# Patient Record
Sex: MALE | URBAN - METROPOLITAN AREA
[De-identification: names, ages, dates, MRNs, and addresses within clinical notes are randomized per-mention and may not be internally consistent; named-entity substitution may affect disease eponyms.]

---
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
## 2023-01-04 ENCOUNTER — OFFICE VISIT (OUTPATIENT)
Dept: FAMILY MEDICINE | Facility: CLINIC | Age: 18
End: 2023-01-04
Payer: COMMERCIAL

## 2023-01-04 VITALS
SYSTOLIC BLOOD PRESSURE: 110 MMHG | OXYGEN SATURATION: 96 % | HEIGHT: 71 IN | HEART RATE: 87 BPM | RESPIRATION RATE: 18 BRPM | WEIGHT: 157.88 LBS | TEMPERATURE: 99 F | DIASTOLIC BLOOD PRESSURE: 64 MMHG | BODY MASS INDEX: 22.1 KG/M2

## 2023-01-04 DIAGNOSIS — H61.21 IMPACTED CERUMEN, RIGHT EAR: Primary | ICD-10-CM

## 2023-01-04 DIAGNOSIS — J32.9 RECURRENT SINUSITIS: ICD-10-CM

## 2023-01-04 DIAGNOSIS — R42 DIZZINESS AND GIDDINESS: ICD-10-CM

## 2023-01-04 PROCEDURE — 99204 PR OFFICE/OUTPT VISIT, NEW, LEVL IV, 45-59 MIN: ICD-10-PCS | Mod: S$GLB,,,

## 2023-01-04 PROCEDURE — 99999 PR PBB SHADOW E&M-EST. PATIENT-LVL V: CPT | Mod: PBBFAC,,,

## 2023-01-04 PROCEDURE — 99999 PR PBB SHADOW E&M-EST. PATIENT-LVL V: ICD-10-PCS | Mod: PBBFAC,,,

## 2023-01-04 PROCEDURE — 99204 OFFICE O/P NEW MOD 45 MIN: CPT | Mod: S$GLB,,,

## 2023-01-04 RX ORDER — MECLIZINE HCL 12.5 MG 12.5 MG/1
12.5 TABLET ORAL 3 TIMES DAILY PRN
Qty: 90 TABLET | Refills: 1 | Status: SHIPPED | OUTPATIENT
Start: 2023-01-04 | End: 2024-01-30

## 2023-01-04 NOTE — PROGRESS NOTES
Subjective:       Patient ID: Graeme Ramirez is a 17 y.o. male.    Chief Complaint: Ear Problem (Right), Loss of Consciousness, and Dizziness    Presents to the clinic w/ complaints of R ear congestion and loss of hearing. He has been experiencing episodes of dizziness and lost consciousness briefly during one event. Saw another provider recently for same. His parents are on the phone during the visit and they assist with HPI. They report that the pt has extensive ENT hx. States that he has had multiple tube procedures, adenoid removal, tonsillectomy. They report that he continues to have issues with sinus drainage. Ears get congested frequently. Lots of cerumen. He was given drops at last appt with have provided mild relief. His mother states that when his sinuses get blocked up he quickly develops an acute illness which eventually progresses to worsening of asthma, chest congestion and pneumonia. They explain that the dizziness is a recurrent issue for him. Relieved with meclizine before.         History reviewed. No pertinent past medical history.    Review of patient's allergies indicates:  No Known Allergies      Current Outpatient Medications:     albuterol (PROVENTIL/VENTOLIN HFA) 90 mcg/actuation inhaler, INHALE 2 PUFFS INTO THE LUNGS EVERY 4 (FOUR) HOURS AS NEEDED FOR WHEEZING, use 30 min prior to workouts, Disp: 18 g, Rfl: 6    azelastine (ASTELIN) 137 mcg (0.1 %) nasal spray, 1 spray (137 mcg total) by Nasal route 2 (two) times daily., Disp: 30 mL, Rfl: 1    ciprofloxacin-dexAMETHasone 0.3-0.1% (CIPRODEX) 0.3-0.1 % DrpS, Place 4 drops into both ears 2 (two) times daily., Disp: 7.5 mL, Rfl: 0    fluticasone propionate (FLONASE) 50 mcg/actuation nasal spray, SPRAY 2 SPRAYS INTO EACH NOSTRIL EVERY DAY, Disp: , Rfl:     loratadine (CLARITIN) 10 mg tablet, Take 1 tablet (10 mg total) by mouth once daily., Disp: 30 tablet, Rfl: 6    montelukast (SINGULAIR) 10 mg tablet, Take 1 tablet (10 mg total) by mouth  "every evening., Disp: 30 tablet, Rfl: 2    olopatadine (PATADAY) 0.2 % Drop, INSTILL 1 DROP INTO BOTH EYES EVERY DAY, Disp: , Rfl:     meclizine (ANTIVERT) 12.5 mg tablet, Take 1 tablet (12.5 mg total) by mouth 3 (three) times daily as needed for Dizziness., Disp: 90 tablet, Rfl: 1    Review of Systems   HENT:  Positive for congestion, ear discharge, hearing loss and sinus pressure. Negative for ear pain.    Neurological:  Positive for dizziness and syncope.     Objective:      /64 (BP Location: Right arm, Patient Position: Sitting, BP Method: Medium (Manual))   Pulse 87   Temp 98.5 °F (36.9 °C) (Oral)   Resp 18   Ht 5' 11" (1.803 m)   Wt 71.6 kg (157 lb 13.6 oz)   SpO2 96%   BMI 22.02 kg/m²   Physical Exam  Vitals reviewed.   Constitutional:       General: He is not in acute distress.     Appearance: Normal appearance. He is normal weight. He is not ill-appearing, toxic-appearing or diaphoretic.   HENT:      Head: Normocephalic.      Right Ear: External ear normal. There is impacted cerumen.      Left Ear: External ear normal. There is impacted cerumen.      Ears:      Comments: Clear after washout       Nose: Nose normal. No congestion or rhinorrhea.      Mouth/Throat:      Mouth: Mucous membranes are moist.      Pharynx: Oropharynx is clear.   Eyes:      General: No scleral icterus.        Right eye: No discharge.         Left eye: No discharge.      Extraocular Movements: Extraocular movements intact.      Conjunctiva/sclera: Conjunctivae normal.   Cardiovascular:      Rate and Rhythm: Normal rate and regular rhythm.      Pulses: Normal pulses.      Heart sounds: Normal heart sounds. No murmur heard.    No friction rub. No gallop.   Pulmonary:      Effort: Pulmonary effort is normal. No respiratory distress.      Breath sounds: Normal breath sounds. No wheezing, rhonchi or rales.   Chest:      Chest wall: No tenderness.   Musculoskeletal:         General: No swelling, tenderness or deformity. Normal " range of motion.      Cervical back: Normal range of motion.      Right lower leg: No edema.      Left lower leg: No edema.   Skin:     General: Skin is warm and dry.      Capillary Refill: Capillary refill takes less than 2 seconds.      Coloration: Skin is not jaundiced.      Findings: No bruising, erythema, lesion or rash.   Neurological:      Mental Status: He is alert and oriented to person, place, and time.      Gait: Gait normal.   Psychiatric:         Mood and Affect: Mood normal.         Behavior: Behavior normal.         Thought Content: Thought content normal.         Judgment: Judgment normal.       Assessment:       1. Impacted cerumen, right ear    2. Recurrent sinusitis    3. Dizziness and giddiness          Plan:       Impacted cerumen, right ear  -     Ambulatory referral/consult to ENT; Future; Expected date: 01/11/2023        -     Copious amounts of cerumen removed bilaterally with ear wash.  I examined the ear again after the wash and could visualize the TM bilaterally. Canals clear. Recommended he  debrox to wash out ears occasionally and keep them clear. Will have him establish with an ENT given his hx.     Recurrent sinusitis  -     Ambulatory referral/consult to ENT; Future; Expected date: 01/11/2023    Dizziness and giddiness  -     meclizine (ANTIVERT) 12.5 mg tablet; Take 1 tablet (12.5 mg total) by mouth 3 (three) times daily as needed for Dizziness.  Dispense: 90 tablet; Refill: 1                 Johan Mcwilliams PA-C  Family Medicine Physician Assistant       I spent a total of 20 minutes on the day of the visit.This includes face to face time and non-face to face time preparing to see the patient (eg, review of tests), obtaining and/or reviewing separately obtained history, documenting clinical information in the electronic or other health record, independently interpreting results and communicating results to the patient/family/caregiver, or care coordinator.      We have  addressed [4] Moderate: 1 or more chronic illnesses with exacerbation, progression, or side effects of treatment / 2 or more stable chronic illnesses / 1 undiagnosed new problem with uncertain prognosis / 1 acute illness with systemic symptoms / 1 acute complicated injury  The complexity of the data reviewed and analyzed for this visit was [2] Minimal or None  The risk of complications and/or morbidity or mortality are [4] Moderate risk (I.e. prescription drug management / decision regarding minor surgery with identified pt or procedure risk factors / decision regarding elective major surgery without identified pt or procedure risk factors / diagnosis or treatment significantly limited by social determinants of health)   The level of Medical Decision Making for this visit is [4] Moderate

## 2023-01-04 NOTE — PATIENT INSTRUCTIONS
Debrox ear cleaning kits at any pharmacy. Its a yellow and green box.       Matthew Bedolla W,     If you are due for any health screening(s) below please notify me so we can arrange them to be ordered and scheduled to maintain your health. Most healthy patients complete it. Don't lose out on improving your health.     All of your core healthy metrics are met.

## 2023-12-06 ENCOUNTER — OFFICE VISIT (OUTPATIENT)
Dept: URGENT CARE | Facility: CLINIC | Age: 18
End: 2023-12-06
Payer: COMMERCIAL

## 2023-12-06 VITALS
HEART RATE: 66 BPM | SYSTOLIC BLOOD PRESSURE: 115 MMHG | RESPIRATION RATE: 16 BRPM | BODY MASS INDEX: 23.07 KG/M2 | OXYGEN SATURATION: 100 % | TEMPERATURE: 98 F | WEIGHT: 161.19 LBS | HEIGHT: 70 IN | DIASTOLIC BLOOD PRESSURE: 78 MMHG

## 2023-12-06 DIAGNOSIS — J45.30 MILD PERSISTENT ASTHMA WITHOUT COMPLICATION: ICD-10-CM

## 2023-12-06 DIAGNOSIS — R09.89 SYMPTOMS OF UPPER RESPIRATORY INFECTION (URI): Primary | ICD-10-CM

## 2023-12-06 LAB
CTP QC/QA: YES
MOLECULAR STREP A: NEGATIVE
POC MOLECULAR INFLUENZA A AGN: NEGATIVE
POC MOLECULAR INFLUENZA B AGN: NEGATIVE
SARS-COV-2 RDRP RESP QL NAA+PROBE: NEGATIVE

## 2023-12-06 PROCEDURE — 99214 OFFICE O/P EST MOD 30 MIN: CPT | Mod: S$PBB,,,

## 2023-12-06 PROCEDURE — 99214 OFFICE O/P EST MOD 30 MIN: CPT | Mod: PBBFAC

## 2023-12-06 PROCEDURE — 87635 SARS-COV-2 COVID-19 AMP PRB: CPT | Mod: PBBFAC

## 2023-12-06 PROCEDURE — 99214 PR OFFICE/OUTPT VISIT, EST, LEVL IV, 30-39 MIN: ICD-10-PCS | Mod: S$PBB,,,

## 2023-12-06 PROCEDURE — 87651 STREP A DNA AMP PROBE: CPT | Mod: PBBFAC

## 2023-12-06 PROCEDURE — 87502 INFLUENZA DNA AMP PROBE: CPT | Mod: PBBFAC

## 2023-12-06 RX ORDER — PROMETHAZINE HYDROCHLORIDE AND DEXTROMETHORPHAN HYDROBROMIDE 6.25; 15 MG/5ML; MG/5ML
5 SYRUP ORAL EVERY 6 HOURS PRN
Qty: 118 ML | Refills: 0 | Status: SHIPPED | OUTPATIENT
Start: 2023-12-06 | End: 2023-12-16

## 2023-12-06 RX ORDER — BENZONATATE 200 MG/1
200 CAPSULE ORAL 3 TIMES DAILY PRN
Qty: 30 CAPSULE | Refills: 0 | Status: SHIPPED | OUTPATIENT
Start: 2023-12-06 | End: 2023-12-16

## 2023-12-06 RX ORDER — MONTELUKAST SODIUM 10 MG/1
10 TABLET ORAL NIGHTLY
Qty: 30 TABLET | Refills: 2 | Status: SHIPPED | OUTPATIENT
Start: 2023-12-06

## 2023-12-06 RX ORDER — ALBUTEROL SULFATE 90 UG/1
AEROSOL, METERED RESPIRATORY (INHALATION)
Qty: 18 G | Refills: 6 | Status: SHIPPED | OUTPATIENT
Start: 2023-12-06

## 2023-12-06 NOTE — LETTER
December 6, 2023      [facility]  [address] [address]  Phone: [phone]       Patient: Graeme Ramirez   YOB: 2005  Date of Visit: 12/06/2023    To Whom It May Concern:    Cresencio Ramirez  was at [facility] on 12/06/2023. The patient may return to work/school on 12/8/2023 with no restrictions. If you have any questions or concerns, or if I can be of further assistance, please do not hesitate to contact me.    Sincerely,    JONES Duffy

## 2023-12-06 NOTE — PROGRESS NOTES
"Subjective:      Patient ID: Graeme Ramirez is a 18 y.o. male.    Vitals:  height is 5' 10" (1.778 m) and weight is 73.1 kg (161 lb 3.2 oz). His temperature is 98.4 °F (36.9 °C). His blood pressure is 115/78 and his pulse is 66. His respiration is 16 and oxygen saturation is 100%.     Chief Complaint: URI (Cough, congestion, sore throat since yesterday.)    Pt states cough, congestion and sore throat since yesterday. Denies known sick contacts.     URI   Associated symptoms include congestion, coughing and a sore throat.       Constitution: Negative.   HENT:  Positive for congestion and sore throat.    Neck: neck negative.   Cardiovascular: Negative.    Eyes: Negative.    Respiratory:  Positive for cough.    Gastrointestinal: Negative.    Genitourinary: Negative.    Musculoskeletal: Negative.    Skin: Negative.    Neurological: Negative.       Objective:     Physical Exam   Constitutional: He is oriented to person, place, and time. normal  HENT:   Head: Normocephalic.   Ears:   Right Ear: External ear normal. impacted cerumen  Left Ear: External ear normal. impacted cerumen  Nose: Congestion present.   Mouth/Throat: Uvula is midline, oropharynx is clear and moist and mucous membranes are normal. Mucous membranes are moist. Oropharynx is clear.   Eyes: Pupils are equal, round, and reactive to light.   Neck: Neck supple.   Cardiovascular: Normal rate, regular rhythm, normal heart sounds and normal pulses.   Pulmonary/Chest: Effort normal and breath sounds normal.   Abdominal: Normal appearance. Soft.   Musculoskeletal: Normal range of motion.         General: Normal range of motion.   Neurological: He is alert and oriented to person, place, and time.   Skin: Skin is warm and dry.   Vitals reviewed.    Results for orders placed or performed in visit on 12/06/23   POCT COVID-19 Rapid Screening   Result Value Ref Range    POC Rapid COVID Negative Negative     Acceptable Yes    POCT Influenza A/B " Molecular   Result Value Ref Range    POC Molecular Influenza A Ag Negative Negative, Not Reported    POC Molecular Influenza B Ag Negative Negative, Not Reported     Acceptable Yes    POCT Strep A, Molecular   Result Value Ref Range    Molecular Strep A, POC Negative Negative     Acceptable Yes         Assessment:     1. Symptoms of upper respiratory infection (URI)    2. Mild persistent asthma without complication        Plan:       Symptoms of upper respiratory infection (URI)  -     POCT COVID-19 Rapid Screening  -     POCT Influenza A/B Molecular  -     POCT Strep A, Molecular  -     benzonatate (TESSALON) 200 MG capsule; Take 1 capsule (200 mg total) by mouth 3 (three) times daily as needed for Cough.  Dispense: 30 capsule; Refill: 0  -     promethazine-dextromethorphan (PROMETHAZINE-DM) 6.25-15 mg/5 mL Syrp; Take 5 mLs by mouth every 6 (six) hours as needed (cough).  Dispense: 118 mL; Refill: 0    Mild persistent asthma without complication  -     albuterol (PROVENTIL/VENTOLIN HFA) 90 mcg/actuation inhaler; INHALE 2 PUFFS INTO THE LUNGS EVERY 4 (FOUR) HOURS AS NEEDED FOR WHEEZING, use 30 min prior to workouts  Dispense: 18 g; Refill: 6  -     montelukast (SINGULAIR) 10 mg tablet; Take 1 tablet (10 mg total) by mouth every evening.  Dispense: 30 tablet; Refill: 2  -     carbamide peroxide (DEBROX) 6.5 % otic solution; Place 5 drops into both ears 2 (two) times daily.  Dispense: 15 mL; Refill: 0  -     Ambulatory referral/consult to Family Practice      Please drink plenty of fluids.  Please get plenty of rest.    Take over the counter Tylenol (Acetaminophen) and/or Motrin (Ibuprofen) as directed for control of pain and/or fever.  Please follow up with your primary care doctor.     ER precautions given, patient verbalized understanding.     Please see provided patient education for guidance.    Follow up with PCP or return to clinic if symptoms worsen or do not improve.

## 2023-12-12 ENCOUNTER — OFFICE VISIT (OUTPATIENT)
Dept: URGENT CARE | Facility: CLINIC | Age: 18
End: 2023-12-12
Payer: COMMERCIAL

## 2023-12-12 VITALS
RESPIRATION RATE: 18 BRPM | HEART RATE: 59 BPM | SYSTOLIC BLOOD PRESSURE: 115 MMHG | WEIGHT: 164.38 LBS | BODY MASS INDEX: 23.53 KG/M2 | DIASTOLIC BLOOD PRESSURE: 74 MMHG | HEIGHT: 70 IN | TEMPERATURE: 98 F | OXYGEN SATURATION: 99 %

## 2023-12-12 DIAGNOSIS — H61.20 IMPACTED CERUMEN, UNSPECIFIED LATERALITY: Primary | ICD-10-CM

## 2023-12-12 PROCEDURE — 99214 OFFICE O/P EST MOD 30 MIN: CPT | Mod: PBBFAC | Performed by: FAMILY MEDICINE

## 2023-12-12 PROCEDURE — 69209 REMOVE IMPACTED EAR WAX UNI: CPT | Mod: PBBFAC | Performed by: FAMILY MEDICINE

## 2023-12-12 PROCEDURE — 69209 EAR CERUMEN REMOVAL: ICD-10-PCS | Mod: S$PBB,50,, | Performed by: FAMILY MEDICINE

## 2023-12-12 NOTE — PROCEDURES
"Ear Cerumen Removal    Date/Time: 12/12/2023 4:15 PM    Performed by: Duncan Paniagua MD  Authorized by: Duncan Paniagua MD    Time out: Immediately prior to procedure a "time out" was called to verify the correct patient, procedure, equipment, support staff and site/side marked as required.    Consent Done?:  Yes (Verbal)    Local anesthetic:  None  Location details:  Both ears  Procedure type: irrigation    Cerumen  Removal Results:  Cerumen completely removed  Patient tolerance:  Patient tolerated the procedure well with no immediate complications    "

## 2023-12-12 NOTE — PROGRESS NOTES
"Subjective:       Patient ID: Graeme Ramirez is a 18 y.o. male.    Vitals:  height is 5' 10" (1.778 m) and weight is 74.6 kg (164 lb 6.4 oz). His oral temperature is 98.1 °F (36.7 °C). His blood pressure is 115/74 and his pulse is 59 (abnormal). His respiration is 18 and oxygen saturation is 99%.     Chief Complaint: Cerumen Impaction (Patient reports both ears clogged and sounds "muffled" )    Patient with a history of bilateral cerumen impaction.  States he gets his ears cleaned every 6 months or so.  Having a recurrence.  Decreased hearing, especially on the left.  Occasional drainage on the right.  No pain.  No sore throat.  No fever.  Chart reviewed        Objective:   Physical Exam   Constitutional: He appears well-developed.  Non-toxic appearance. He does not appear ill. No distress.   HENT:   Ears:   Right Ear: impacted cerumen  Left Ear: impacted cerumen  Mouth/Throat: No posterior oropharyngeal erythema.   Neck: No neck rigidity present.   Abdominal: Normal appearance.   Lymphadenopathy:     He has no cervical adenopathy.   Neurological: no focal deficit. He is alert. He displays no weakness.   Skin: Skin is not diaphoretic. Capillary refill takes less than 2 seconds.   Psychiatric: His behavior is normal. Mood normal.   Nursing note and vitals reviewed.        Assessment:     1. Impacted cerumen, unspecified laterality          Plan:   Bilateral ear flush with complete removal of cerumen.  Tolerated well.  He will get over-the-counter ear cleaning kit as we discussed.  Return to urgent care if need    Impacted cerumen, unspecified laterality        Please note: This chart was completed via voice to text dictation. It may contain typographical/word recognition errors. If there are any questions, please contact the provider for final clarification.      "

## 2024-01-30 ENCOUNTER — OFFICE VISIT (OUTPATIENT)
Dept: URGENT CARE | Facility: CLINIC | Age: 19
End: 2024-01-30
Payer: COMMERCIAL

## 2024-01-30 VITALS
BODY MASS INDEX: 22.54 KG/M2 | HEIGHT: 71 IN | SYSTOLIC BLOOD PRESSURE: 118 MMHG | HEART RATE: 101 BPM | OXYGEN SATURATION: 97 % | WEIGHT: 161 LBS | TEMPERATURE: 99 F | DIASTOLIC BLOOD PRESSURE: 83 MMHG | RESPIRATION RATE: 20 BRPM

## 2024-01-30 DIAGNOSIS — R10.9 ABDOMINAL PAIN, UNSPECIFIED ABDOMINAL LOCATION: ICD-10-CM

## 2024-01-30 DIAGNOSIS — J30.2 SEASONAL ALLERGIES: Primary | ICD-10-CM

## 2024-01-30 DIAGNOSIS — U07.1 COVID-19 VIRUS DETECTED: ICD-10-CM

## 2024-01-30 DIAGNOSIS — R51.9 NONINTRACTABLE HEADACHE, UNSPECIFIED CHRONICITY PATTERN, UNSPECIFIED HEADACHE TYPE: ICD-10-CM

## 2024-01-30 DIAGNOSIS — R42 DIZZINESS: ICD-10-CM

## 2024-01-30 DIAGNOSIS — R07.0 THROAT DISCOMFORT: ICD-10-CM

## 2024-01-30 LAB
CTP QC/QA: YES
CTP QC/QA: YES
FLUAV AG UPPER RESP QL IA.RAPID: NOT DETECTED
FLUBV AG UPPER RESP QL IA.RAPID: NOT DETECTED
MOLECULAR STREP A: NEGATIVE
POC MOLECULAR INFLUENZA A AGN: NEGATIVE
POC MOLECULAR INFLUENZA B AGN: NEGATIVE
RSV A 5' UTR RNA NPH QL NAA+PROBE: NOT DETECTED
SARS-COV-2 RNA RESP QL NAA+PROBE: DETECTED

## 2024-01-30 PROCEDURE — 99214 OFFICE O/P EST MOD 30 MIN: CPT | Mod: S$PBB,,,

## 2024-01-30 PROCEDURE — 87502 INFLUENZA DNA AMP PROBE: CPT | Mod: PBBFAC

## 2024-01-30 PROCEDURE — 99214 OFFICE O/P EST MOD 30 MIN: CPT | Mod: PBBFAC

## 2024-01-30 PROCEDURE — 0241U COVID/RSV/FLU A&B PCR: CPT

## 2024-01-30 PROCEDURE — 87651 STREP A DNA AMP PROBE: CPT | Mod: PBBFAC

## 2024-01-30 RX ORDER — MECLIZINE HYDROCHLORIDE 25 MG/1
25 TABLET ORAL 3 TIMES DAILY PRN
Qty: 21 TABLET | Refills: 0 | Status: SHIPPED | OUTPATIENT
Start: 2024-01-30

## 2024-01-30 RX ORDER — DICYCLOMINE HYDROCHLORIDE 20 MG/1
20 TABLET ORAL EVERY 6 HOURS
Qty: 20 TABLET | Refills: 0 | Status: SHIPPED | OUTPATIENT
Start: 2024-01-30 | End: 2024-02-04

## 2024-01-30 NOTE — LETTER
January 30, 2024      [facility]  [address] [facility] [address]  Phone: [phone]       Patient: Graeme Ramirez   YOB: 2005  Date of Visit: 01/30/2024    To Whom It May Concern:    Cresencio Ramirez  was at [facility] on 01/30/2024. The patient may return to work/school on 02/03/2024 with no restrictions. If you have any questions or concerns, or if I can be of further assistance, please do not hesitate to contact me.    Sincerely,    JONES Hawk

## 2024-01-30 NOTE — PROGRESS NOTES
"Subjective:       Patient ID: Graeme Ramirez is a 18 y.o. male.    Vitals:  height is 5' 11" (1.803 m) and weight is 73 kg (161 lb). His oral temperature is 98.8 °F (37.1 °C). His blood pressure is 118/83 and his pulse is 101. His respiration is 20 and oxygen saturation is 97%.     Chief Complaint: URI (Nasal congestion, headache, dizziness, nausea, abdominal pain and itchy throat.)    18-year-old  male reports symptoms for 2 days, states he has been taking DayQuil with some improvement.  States dizziness worsens upon awakening which last approximately 30 minutes.  Denies any known ill contacts.     URI   Associated symptoms include abdominal pain, headaches, nausea, rhinorrhea and a sore throat. Pertinent negatives include no coughing, diarrhea, ear pain or vomiting.       Constitution: Positive for chills and fatigue. Negative for fever.   HENT:  Positive for sore throat. Negative for ear pain.    Respiratory:  Negative for cough and shortness of breath.    Gastrointestinal:  Positive for abdominal pain and nausea. Negative for vomiting and diarrhea.   Musculoskeletal:  Negative for muscle ache.   Allergic/Immunologic: Positive for seasonal allergies.   Neurological:  Positive for dizziness and headaches.       Objective:      Physical Exam   Constitutional: He is oriented to person, place, and time. He is cooperative. He is easily aroused. He does not appear ill. No distress. awake  HENT:   Head: Normocephalic and atraumatic.   Ears:   Right Ear: Tympanic membrane normal.   Left Ear: Tympanic membrane normal.   Nose: Nose normal. Right sinus exhibits no maxillary sinus tenderness and no frontal sinus tenderness. Left sinus exhibits no maxillary sinus tenderness and no frontal sinus tenderness.   Mouth/Throat: Uvula is midline and mucous membranes are normal. Posterior oropharyngeal erythema present. Tonsils are 1+ on the right. Tonsils are 1+ on the left. No tonsillar exudate.   Right ear canal is " red      Comments: Right ear canal is red  Eyes: Conjunctivae and lids are normal. Right eye exhibits nystagmus. Left eye exhibits nystagmus. vision grossly intact      Comments: Few beats of horizontal nystagmus   Neck: Neck supple.   Cardiovascular: S1 normal, S2 normal and normal heart sounds. Tachycardia present.      Comments: Apical 106 beats per minute   Pulmonary/Chest: Effort normal and breath sounds normal.   Abdominal: Normal appearance and bowel sounds are normal. Soft. flat abdomen There is no abdominal tenderness.   Lymphadenopathy:     He has no cervical adenopathy.   Neurological: He is alert, oriented to person, place, and time and easily aroused. GCS eye subscore is 4. GCS verbal subscore is 5. GCS motor subscore is 6.   Skin: Skin is warm, dry and intact. Capillary refill takes less than 2 seconds.   Psychiatric: His behavior is normal.   Nursing note and vitals reviewed.        Assessment:       1. Seasonal allergies    2. Nonintractable headache, unspecified chronicity pattern, unspecified headache type    3. Throat discomfort    4. Dizziness    5. Abdominal pain, unspecified abdominal location          Plan:    PCR is pending, we will prescribe meclizine for dizziness symptoms.  Instructed patient to remain hydrated, avoid any allergy triggers. Follow up with PCP in 1 week if symptoms are still present.    Seasonal allergies    Nonintractable headache, unspecified chronicity pattern, unspecified headache type  -     POCT COVID-19 Rapid Screening  -     POCT Influenza A/B MOLECULAR  -     COVID/RSV/FLU A&B PCR; Future; Expected date: 01/30/2024    Throat discomfort  -     POCT Strep A, Molecular    Dizziness  -     meclizine (ANTIVERT) 25 mg tablet; Take 1 tablet (25 mg total) by mouth 3 (three) times daily as needed for Dizziness.  Dispense: 21 tablet; Refill: 0    Abdominal pain, unspecified abdominal location  -     dicyclomine (BENTYL) 20 mg tablet; Take 1 tablet (20 mg total) by mouth every  6 (six) hours. for 5 days  Dispense: 20 tablet; Refill: 0

## 2024-01-30 NOTE — LETTER
January 30, 2024      [facility]  [address] [facility] [facility] [address]  Phone: [phone]       Patient: Graeme Ramirez   YOB: 2005  Date of Visit: 01/30/2024    To Whom It May Concern:    Cresencio Ramirez  was at [facility] on 01/30/2024. The patient may return to work/school on 02/01/2024 with no restrictions. If you have any questions or concerns, or if I can be of further assistance, please do not hesitate to contact me.    Sincerely,      JONES Hawk

## 2024-01-30 NOTE — PROGRESS NOTES
Informed patient via phone (2 verifiers) he has tested positive for COVID, ED precautions discussed, no change in treatment plan, excuse extended.

## 2024-07-09 ENCOUNTER — PATIENT MESSAGE (OUTPATIENT)
Dept: ADMINISTRATIVE | Facility: HOSPITAL | Age: 19
End: 2024-07-09
Payer: COMMERCIAL